# Patient Record
Sex: MALE | Race: WHITE | Employment: FULL TIME | URBAN - NONMETROPOLITAN AREA
[De-identification: names, ages, dates, MRNs, and addresses within clinical notes are randomized per-mention and may not be internally consistent; named-entity substitution may affect disease eponyms.]

---

## 2023-05-12 ENCOUNTER — APPOINTMENT (OUTPATIENT)
Dept: GENERAL RADIOLOGY | Age: 38
End: 2023-05-12
Payer: COMMERCIAL

## 2023-05-12 ENCOUNTER — HOSPITAL ENCOUNTER (EMERGENCY)
Age: 38
Discharge: HOME | End: 2023-05-12
Payer: COMMERCIAL

## 2023-05-12 VITALS
RESPIRATION RATE: 12 BRPM | WEIGHT: 185 LBS | SYSTOLIC BLOOD PRESSURE: 156 MMHG | BODY MASS INDEX: 29.73 KG/M2 | TEMPERATURE: 97.2 F | HEIGHT: 66 IN | HEART RATE: 51 BPM | DIASTOLIC BLOOD PRESSURE: 83 MMHG | OXYGEN SATURATION: 99 %

## 2023-05-12 DIAGNOSIS — S22.41XA CLOSED FRACTURE OF MULTIPLE RIBS OF RIGHT SIDE, INITIAL ENCOUNTER: Primary | ICD-10-CM

## 2023-05-12 PROCEDURE — 99203 OFFICE O/P NEW LOW 30 MIN: CPT

## 2023-05-12 PROCEDURE — 99202 OFFICE O/P NEW SF 15 MIN: CPT | Performed by: NURSE PRACTITIONER

## 2023-05-12 PROCEDURE — 71101 X-RAY EXAM UNILAT RIBS/CHEST: CPT

## 2023-05-12 RX ORDER — SERTRALINE HYDROCHLORIDE 100 MG/1
100 TABLET, FILM COATED ORAL DAILY
COMMUNITY

## 2023-05-12 RX ORDER — IBUPROFEN 800 MG/1
800 TABLET ORAL EVERY 8 HOURS PRN
Qty: 30 TABLET | Refills: 0 | Status: SHIPPED | OUTPATIENT
Start: 2023-05-12

## 2023-05-12 ASSESSMENT — PAIN DESCRIPTION - ORIENTATION: ORIENTATION: RIGHT

## 2023-05-12 ASSESSMENT — PAIN - FUNCTIONAL ASSESSMENT: PAIN_FUNCTIONAL_ASSESSMENT: 0-10

## 2023-05-12 ASSESSMENT — PAIN DESCRIPTION - LOCATION: LOCATION: RIB CAGE

## 2023-05-12 ASSESSMENT — PAIN SCALES - GENERAL: PAINLEVEL_OUTOF10: 4

## 2023-05-12 ASSESSMENT — ENCOUNTER SYMPTOMS
COUGH: 0
SHORTNESS OF BREATH: 0
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
## 2023-05-12 NOTE — ED PROVIDER NOTES
multiple ribs of right side, initial encounter        DISPOSITION/PLAN   DISPOSITION Decision To Discharge 05/12/2023 05:16:59 PM    Xray as per radiologist read. Limited activity as discussed. Follow up with PCP 3-5 days once returning back home, as he is he here from out of town for Mothers Day. Warning signs needing immediate emergency care discussed. Problem List Items Addressed This Visit    None  Visit Diagnoses       Closed fracture of multiple ribs of right side, initial encounter    -  Primary    Relevant Medications    ibuprofen (ADVIL;MOTRIN) 800 MG tablet                 The results of pertinent diagnostic studies and exam findings were discussed with patient/patient representative. Shared decision-making was performed and patient/patient representative are agreeable that they are suitable for discharge at this time. The patients provisional diagnosis and plan of care were discussed with the patient/patient representative who expressed understanding. The patient/representative is given strict written and verbal instructions about care at home, including over-the-counter management, prescription information, follow-up, and signs and symptoms of worsening of condition, and the patient/patient representative did verbalize understanding of these instructions. Patient will go to nearest emergency department if symptoms change or worsen, or for any sign or symptom deemed emergent by the patient or family members. Follow up as an outpatient with PCP within the next 3 days, or sooner if symptoms warrant. PATIENT REFERRED TO:  Nikia Villasenor DO  [id number] [facility] [id number] [phone]    Schedule an appointment as soon as possible for a visit in 3 days  For further evaluation. , If symptoms change/worsen, go to the [address] [facility], APRN - CNP    Please note that some or all of this chart was generated using [employer] voice recognition software.  Although